# Patient Record
Sex: MALE | Race: WHITE | NOT HISPANIC OR LATINO | Employment: FULL TIME | URBAN - METROPOLITAN AREA
[De-identification: names, ages, dates, MRNs, and addresses within clinical notes are randomized per-mention and may not be internally consistent; named-entity substitution may affect disease eponyms.]

---

## 2017-05-05 ENCOUNTER — ALLSCRIPTS OFFICE VISIT (OUTPATIENT)
Dept: OTHER | Facility: OTHER | Age: 32
End: 2017-05-05

## 2017-06-01 ENCOUNTER — ALLSCRIPTS OFFICE VISIT (OUTPATIENT)
Dept: OTHER | Facility: OTHER | Age: 32
End: 2017-06-01

## 2017-06-01 DIAGNOSIS — M54.9 DORSALGIA: ICD-10-CM

## 2017-06-01 LAB
BILIRUB UR QL STRIP: NORMAL
CLARITY UR: NORMAL
COLOR UR: YELLOW
GLUCOSE (HISTORICAL): NORMAL
HGB UR QL STRIP.AUTO: NORMAL
KETONES UR STRIP-MCNC: NORMAL MG/DL
LEUKOCYTE ESTERASE UR QL STRIP: 75
NITRITE UR QL STRIP: NORMAL
PH UR STRIP.AUTO: 8 [PH]
PROT UR STRIP-MCNC: NORMAL MG/DL
SP GR UR STRIP.AUTO: 1
UROBILINOGEN UR QL STRIP.AUTO: NORMAL

## 2018-01-13 VITALS
BODY MASS INDEX: 24.48 KG/M2 | OXYGEN SATURATION: 99 % | RESPIRATION RATE: 16 BRPM | TEMPERATURE: 97.4 F | HEART RATE: 70 BPM | SYSTOLIC BLOOD PRESSURE: 130 MMHG | WEIGHT: 171 LBS | DIASTOLIC BLOOD PRESSURE: 78 MMHG | HEIGHT: 70 IN

## 2018-01-13 VITALS
DIASTOLIC BLOOD PRESSURE: 70 MMHG | WEIGHT: 169 LBS | HEART RATE: 68 BPM | HEIGHT: 70 IN | SYSTOLIC BLOOD PRESSURE: 120 MMHG | RESPIRATION RATE: 18 BRPM | TEMPERATURE: 97.7 F | BODY MASS INDEX: 24.2 KG/M2

## 2018-07-30 ENCOUNTER — OFFICE VISIT (OUTPATIENT)
Dept: FAMILY MEDICINE CLINIC | Facility: CLINIC | Age: 33
End: 2018-07-30
Payer: COMMERCIAL

## 2018-07-30 VITALS
WEIGHT: 162 LBS | RESPIRATION RATE: 16 BRPM | BODY MASS INDEX: 23.19 KG/M2 | DIASTOLIC BLOOD PRESSURE: 70 MMHG | TEMPERATURE: 97.6 F | HEIGHT: 70 IN | SYSTOLIC BLOOD PRESSURE: 122 MMHG | HEART RATE: 80 BPM

## 2018-07-30 DIAGNOSIS — J30.2 SEASONAL ALLERGIC RHINITIS, UNSPECIFIED TRIGGER: Primary | ICD-10-CM

## 2018-07-30 DIAGNOSIS — R06.00 DYSPNEA, UNSPECIFIED TYPE: ICD-10-CM

## 2018-07-30 PROCEDURE — 1036F TOBACCO NON-USER: CPT | Performed by: NURSE PRACTITIONER

## 2018-07-30 PROCEDURE — 3008F BODY MASS INDEX DOCD: CPT | Performed by: NURSE PRACTITIONER

## 2018-07-30 PROCEDURE — 99214 OFFICE O/P EST MOD 30 MIN: CPT | Performed by: NURSE PRACTITIONER

## 2018-07-30 RX ORDER — ALBUTEROL SULFATE 90 UG/1
2 AEROSOL, METERED RESPIRATORY (INHALATION) EVERY 6 HOURS PRN
Qty: 1 INHALER | Refills: 3 | Status: SHIPPED | OUTPATIENT
Start: 2018-07-30 | End: 2020-01-27 | Stop reason: SDUPTHER

## 2018-07-30 RX ORDER — MONTELUKAST SODIUM 10 MG/1
10 TABLET ORAL
Qty: 30 TABLET | Refills: 5 | Status: SHIPPED | OUTPATIENT
Start: 2018-07-30 | End: 2020-01-27 | Stop reason: SDUPTHER

## 2018-07-30 NOTE — PATIENT INSTRUCTIONS
Singulair 10mg daily  Rescue inhaler , 2 puffs every 4-6 hours as needed for shortness breath, chest tightness, bronchospasm, coughing fits

## 2018-07-30 NOTE — PROGRESS NOTES
Assessment/Plan:  1  Seasonal allergic rhinitis, unspecified trigger  - montelukast (SINGULAIR) 10 mg tablet; Take 1 tablet (10 mg total) by mouth daily at bedtime  Dispense: 30 tablet; Refill: 5  - albuterol (PROVENTIL HFA,VENTOLIN HFA) 90 mcg/act inhaler; Inhale 2 puffs every 6 (six) hours as needed for wheezing or shortness of breath  Dispense: 1 Inhaler; Refill: 3    2  Dyspnea, unspecified type    - montelukast (SINGULAIR) 10 mg tablet; Take 1 tablet (10 mg total) by mouth daily at bedtime  Dispense: 30 tablet; Refill: 5  - albuterol (PROVENTIL HFA,VENTOLIN HFA) 90 mcg/act inhaler; Inhale 2 puffs every 6 (six) hours as needed for wheezing or shortness of breath  Dispense: 1 Inhaler; Refill: 3            Subjective:      Patient ID: Bonifacio Yoder is a 34 y o  male who presents for allergies    Here for allergies  This time of year usually struggles with chest tightness, and difficult to get a full breath  Has been on singulair and rescue inhaler in past    No nasal congestion  No cough  No fever  Symptoms tend to start summer and end when winter starts  The following portions of the patient's history were reviewed and updated as appropriate: allergies, current medications, past family history, past medical history, past social history, past surgical history and problem list     Review of Systems   HENT: Negative for congestion, ear pain, postnasal drip, rhinorrhea, sinus pain, sinus pressure, sneezing and sore throat  Eyes: Negative for discharge and itching  Respiratory: Positive for chest tightness and shortness of breath  Negative for wheezing  Neurological: Negative for dizziness and headaches           Objective:      /70 (BP Location: Left arm, Patient Position: Sitting, Cuff Size: Standard)   Pulse 80   Temp 97 6 °F (36 4 °C)   Resp 16   Ht 5' 10" (1 778 m)   Wt 73 5 kg (162 lb)   BMI 23 24 kg/m²          Physical Exam   Constitutional: He is oriented to person, place, and time  He appears well-developed and well-nourished  No distress  Cardiovascular: Normal rate, regular rhythm and normal heart sounds  No murmur heard  Pulmonary/Chest: Effort normal and breath sounds normal  No respiratory distress  He has no wheezes  Lymphadenopathy:     He has no cervical adenopathy  Neurological: He is alert and oriented to person, place, and time  Skin: Skin is warm and dry  No rash noted  No erythema  Psychiatric: His behavior is normal  Judgment and thought content normal    Vitals reviewed

## 2020-01-27 ENCOUNTER — OFFICE VISIT (OUTPATIENT)
Dept: FAMILY MEDICINE CLINIC | Facility: CLINIC | Age: 35
End: 2020-01-27
Payer: COMMERCIAL

## 2020-01-27 VITALS
HEIGHT: 70 IN | SYSTOLIC BLOOD PRESSURE: 100 MMHG | DIASTOLIC BLOOD PRESSURE: 85 MMHG | BODY MASS INDEX: 25.62 KG/M2 | WEIGHT: 179 LBS | HEART RATE: 90 BPM | RESPIRATION RATE: 18 BRPM | TEMPERATURE: 97.6 F

## 2020-01-27 DIAGNOSIS — R06.00 DYSPNEA, UNSPECIFIED TYPE: ICD-10-CM

## 2020-01-27 DIAGNOSIS — J30.2 SEASONAL ALLERGIC RHINITIS, UNSPECIFIED TRIGGER: Primary | ICD-10-CM

## 2020-01-27 DIAGNOSIS — N52.9 ERECTILE DYSFUNCTION, UNSPECIFIED ERECTILE DYSFUNCTION TYPE: ICD-10-CM

## 2020-01-27 PROCEDURE — 99214 OFFICE O/P EST MOD 30 MIN: CPT | Performed by: FAMILY MEDICINE

## 2020-01-27 PROCEDURE — 3008F BODY MASS INDEX DOCD: CPT | Performed by: FAMILY MEDICINE

## 2020-01-27 PROCEDURE — 1036F TOBACCO NON-USER: CPT | Performed by: FAMILY MEDICINE

## 2020-01-27 RX ORDER — MONTELUKAST SODIUM 10 MG/1
10 TABLET ORAL
Qty: 30 TABLET | Refills: 5 | Status: SHIPPED | OUTPATIENT
Start: 2020-01-27 | End: 2020-07-07

## 2020-01-27 RX ORDER — ALBUTEROL SULFATE 90 UG/1
2 AEROSOL, METERED RESPIRATORY (INHALATION) EVERY 6 HOURS PRN
Qty: 1 INHALER | Refills: 3 | Status: SHIPPED | OUTPATIENT
Start: 2020-01-27 | End: 2020-07-07

## 2020-01-27 NOTE — PROGRESS NOTES
Chief Complaint   Patient presents with    Shortness of Breath     at rest     Erectile Dysfunction     sporadic        Patient ID: Zane Treviño is a 30 y o  male  HPI  Pt is seeing for seasonal allergies "acting out"  -  Having similar problems every year at this season -  Singulair and rescue inhaler helped in the past -  Also noticed intermittent ED issues over last few months- taking "work out supplements" prior to [employer] time  -  Denies drug use -  Was drinking more  -  Stopped 3 m ago -  Not better     The following portions of the patient's history were reviewed and updated as appropriate: allergies, current medications, past family history, past medical history, past social history, past surgical history and problem list     Review of Systems   Constitutional: Negative  Respiratory: Positive for shortness of breath  Negative for cough, chest tightness and wheezing  Cardiovascular: Negative  Gastrointestinal: Negative  Genitourinary: Negative for decreased urine volume, difficulty urinating, discharge, dysuria, genital sores, penile pain, penile swelling, scrotal swelling and testicular pain  ED    Musculoskeletal: Negative  Skin: Negative  Neurological: Negative  Psychiatric/Behavioral: The patient is nervous/anxious (increased stress at work )  Objective:    /85   Pulse 90   Temp 97 6 °F (36 4 °C) (Tympanic)   Resp 18   Ht 5' 10" (1 778 m)   Wt 81 2 kg (179 lb)   BMI 25 68 kg/m²        Physical Exam   Constitutional: He appears well-nourished  He does not appear ill  HENT:   Mouth/Throat: No oropharyngeal exudate  Cardiovascular: Normal rate and regular rhythm  No murmur heard  Pulmonary/Chest: Effort normal and breath sounds normal  No respiratory distress  He has no wheezes  He has no rales  Abdominal: Soft  Skin: No rash noted  No pallor  Psychiatric: He has a normal mood and affect   His behavior is normal  Judgment and thought content normal          Labs in chart were reviewed  Assessment/Plan:         Diagnoses and all orders for this visit:    Seasonal allergic rhinitis, unspecified trigger  -     albuterol (PROVENTIL HFA,VENTOLIN HFA) 90 mcg/act inhaler; Inhale 2 puffs every 6 (six) hours as needed for wheezing or shortness of breath  -     montelukast (SINGULAIR) 10 mg tablet; Take 1 tablet (10 mg total) by mouth daily at bedtime    Dyspnea, unspecified type  -     albuterol (PROVENTIL HFA,VENTOLIN HFA) 90 mcg/act inhaler; Inhale 2 puffs every 6 (six) hours as needed for wheezing or shortness of breath  -     montelukast (SINGULAIR) 10 mg tablet; Take 1 tablet (10 mg total) by mouth daily at bedtime    Erectile dysfunction, unspecified erectile dysfunction type  -     Ambulatory referral to Urology; Future  Likely psychological  Was advised to see therapist           BMI Counseling: Body mass index is 25 68 kg/m²  Discussed the patient's BMI with him  The BMI is above normal  Nutrition recommendations include reducing portion sizes, decreasing overall calorie intake, 3-5 servings of fruits/vegetables daily, reducing fast food intake and consuming healthier snacks  Exercise recommendations include exercising 3-5 times per week           rto prn         Angie Ernst MD

## 2020-03-03 ENCOUNTER — CONSULT (OUTPATIENT)
Dept: UROLOGY | Facility: CLINIC | Age: 35
End: 2020-03-03
Payer: COMMERCIAL

## 2020-03-03 VITALS
BODY MASS INDEX: 25.22 KG/M2 | HEIGHT: 70 IN | WEIGHT: 176.2 LBS | DIASTOLIC BLOOD PRESSURE: 68 MMHG | SYSTOLIC BLOOD PRESSURE: 116 MMHG | HEART RATE: 120 BPM

## 2020-03-03 DIAGNOSIS — N52.9 ERECTILE DYSFUNCTION, UNSPECIFIED ERECTILE DYSFUNCTION TYPE: ICD-10-CM

## 2020-03-03 PROCEDURE — 1036F TOBACCO NON-USER: CPT | Performed by: PHYSICIAN ASSISTANT

## 2020-03-03 PROCEDURE — 99203 OFFICE O/P NEW LOW 30 MIN: CPT | Performed by: PHYSICIAN ASSISTANT

## 2020-03-03 PROCEDURE — 3008F BODY MASS INDEX DOCD: CPT | Performed by: PHYSICIAN ASSISTANT

## 2020-03-03 RX ORDER — SILDENAFIL CITRATE 20 MG/1
TABLET ORAL
Qty: 10 TABLET | Refills: 3 | Status: SHIPPED | OUTPATIENT
Start: 2020-03-03 | End: 2020-04-14 | Stop reason: SDUPTHER

## 2020-03-03 NOTE — PROGRESS NOTES
1  Erectile dysfunction, unspecified erectile dysfunction type  Ambulatory referral to Urology    sildenafil (REVATIO) 20 mg tablet       Assessment and plan:       1  Erectile dysfunction  - reviewed with patient that this appears primarily psychological in origin  - reviewed minimizing stress, cardiovascular excise, healthy diet, and good sleep  - will trial a short course of Sildenafil 20mg as needed  Use and side effect profile reviewed  - should this not improved, patient was counseled to further seek therapy  - urine encouraged contact us in the future with any concerns, otherwise will follow-up as needed  Anila Edgar PA-C      Chief Complaint     Chief Complaint   Patient presents with    Erectile Dysfunction       History of Present Illness     Shelli Tiwari is a 30 y o  Male presenting today as a new patient for erectile dysfunction  Patient states he had an issue approximately 3 months ago in which he was unable to achieve an erection  Patient states that he remain dated on this for period of time and then was having difficulties achieving fully rigid erections  He has been able to masturbate without any complication however is having issues with sexual relations  Denies any painful erections, pain during ejaculation, or urinary symptoms  Patient does endorse some stressors at his work however the constant for him  He is able to continue with daily exercise and reports adequate sleep  Denies any palpitations or SOB  He has made some dietary adjustments and has not notice any improvement  He denies the use steroids or TRT  Patient denies any history of hypertension, diabetes, lumbosacral disc disease, or underlying neurologic conditions  Review of Systems     Review of Systems   Constitutional: Negative for activity change, appetite change, chills, diaphoresis, fatigue, fever and unexpected weight change     Respiratory: Negative for chest tightness and shortness of breath  Cardiovascular: Negative for chest pain, palpitations and leg swelling  Gastrointestinal: Negative for abdominal distention, abdominal pain, constipation, diarrhea, nausea and vomiting  Genitourinary: Negative for decreased urine volume, difficulty urinating, dysuria, enuresis, flank pain, frequency, genital sores, hematuria and urgency  Erectile dysfunction   Musculoskeletal: Negative for back pain, gait problem and myalgias  Skin: Negative for color change, pallor, rash and wound  Psychiatric/Behavioral: Negative for behavioral problems  The patient is not nervous/anxious  Allergies     No Known Allergies    Physical Exam     Physical Exam   Constitutional: He is oriented to person, place, and time  He appears well-developed and well-nourished  No distress  HENT:   Head: Normocephalic and atraumatic  Right Ear: External ear normal    Left Ear: External ear normal    Nose: Nose normal    Eyes: Conjunctivae are normal  Right eye exhibits no discharge  Left eye exhibits no discharge  Neck: Normal range of motion  No tracheal deviation present  Pulmonary/Chest: Effort normal  No respiratory distress  Musculoskeletal: Normal range of motion  He exhibits no edema or deformity  Neurological: He is alert and oriented to person, place, and time  Skin: Skin is warm and dry  No rash noted  He is not diaphoretic  No erythema     Psychiatric:   Anxious throughout examination         Vital Signs     Vitals:    03/03/20 1450   BP: 116/68   BP Location: Left arm   Patient Position: Sitting   Cuff Size: Standard   Pulse: (!) 120   Weight: 79 9 kg (176 lb 3 2 oz)   Height: 5' 10" (1 778 m)         Current Medications       Current Outpatient Medications:     albuterol (PROVENTIL HFA,VENTOLIN HFA) 90 mcg/act inhaler, Inhale 2 puffs every 6 (six) hours as needed for wheezing or shortness of breath, Disp: 1 Inhaler, Rfl: 3    montelukast (SINGULAIR) 10 mg tablet, Take 1 tablet (10 mg total) by mouth daily at bedtime, Disp: 30 tablet, Rfl: 5    sildenafil (REVATIO) 20 mg tablet, Take 1 tablet PO PRN sexual function, Disp: 10 tablet, Rfl: 3      Active Problems     Patient Active Problem List   Diagnosis    Allergic cough         Past Medical History     History reviewed  No pertinent past medical history  Surgical History     History reviewed  No pertinent surgical history        Family History     Family History   Problem Relation Age of Onset    Hypertension Father          Social History     Social History       Radiology

## 2020-04-14 DIAGNOSIS — N52.9 ERECTILE DYSFUNCTION, UNSPECIFIED ERECTILE DYSFUNCTION TYPE: ICD-10-CM

## 2020-04-15 RX ORDER — SILDENAFIL CITRATE 20 MG/1
TABLET ORAL
Qty: 10 TABLET | Refills: 0 | Status: SHIPPED | OUTPATIENT
Start: 2020-04-15 | End: 2020-05-01 | Stop reason: SDUPTHER

## 2020-05-01 DIAGNOSIS — N52.9 ERECTILE DYSFUNCTION, UNSPECIFIED ERECTILE DYSFUNCTION TYPE: ICD-10-CM

## 2020-05-01 RX ORDER — SILDENAFIL CITRATE 20 MG/1
TABLET ORAL
Qty: 10 TABLET | Refills: 3 | Status: SHIPPED | OUTPATIENT
Start: 2020-05-01 | End: 2020-05-15 | Stop reason: SDUPTHER

## 2020-05-15 ENCOUNTER — OFFICE VISIT (OUTPATIENT)
Dept: FAMILY MEDICINE CLINIC | Facility: CLINIC | Age: 35
End: 2020-05-15
Payer: COMMERCIAL

## 2020-05-15 VITALS — SYSTOLIC BLOOD PRESSURE: 92 MMHG | DIASTOLIC BLOOD PRESSURE: 60 MMHG | TEMPERATURE: 98.8 F | HEART RATE: 72 BPM

## 2020-05-15 DIAGNOSIS — N52.9 ERECTILE DYSFUNCTION, UNSPECIFIED ERECTILE DYSFUNCTION TYPE: ICD-10-CM

## 2020-05-15 DIAGNOSIS — M54.50 ACUTE LEFT-SIDED LOW BACK PAIN WITHOUT SCIATICA: Primary | ICD-10-CM

## 2020-05-15 PROCEDURE — 1036F TOBACCO NON-USER: CPT | Performed by: FAMILY MEDICINE

## 2020-05-15 PROCEDURE — 99214 OFFICE O/P EST MOD 30 MIN: CPT | Performed by: FAMILY MEDICINE

## 2020-05-15 RX ORDER — SILDENAFIL CITRATE 20 MG/1
TABLET ORAL
Qty: 10 TABLET | Refills: 0 | Status: SHIPPED | OUTPATIENT
Start: 2020-05-15 | End: 2020-06-09 | Stop reason: SDUPTHER

## 2020-05-15 RX ORDER — NAPROXEN 500 MG/1
500 TABLET ORAL 2 TIMES DAILY WITH MEALS
Qty: 14 TABLET | Refills: 0 | Status: SHIPPED | OUTPATIENT
Start: 2020-05-15 | End: 2020-07-07

## 2020-05-15 RX ORDER — CYCLOBENZAPRINE HCL 5 MG
5 TABLET ORAL 3 TIMES DAILY PRN
Qty: 30 TABLET | Refills: 0 | Status: SHIPPED | OUTPATIENT
Start: 2020-05-15 | End: 2020-07-07

## 2020-06-09 DIAGNOSIS — N52.9 ERECTILE DYSFUNCTION, UNSPECIFIED ERECTILE DYSFUNCTION TYPE: ICD-10-CM

## 2020-06-10 RX ORDER — SILDENAFIL CITRATE 20 MG/1
TABLET ORAL
Qty: 30 TABLET | Refills: 6 | Status: SHIPPED | OUTPATIENT
Start: 2020-06-10 | End: 2020-07-07

## 2020-07-07 ENCOUNTER — OFFICE VISIT (OUTPATIENT)
Dept: FAMILY MEDICINE CLINIC | Facility: CLINIC | Age: 35
End: 2020-07-07
Payer: COMMERCIAL

## 2020-07-07 VITALS
HEIGHT: 70 IN | TEMPERATURE: 98.6 F | RESPIRATION RATE: 16 BRPM | BODY MASS INDEX: 24.48 KG/M2 | SYSTOLIC BLOOD PRESSURE: 134 MMHG | WEIGHT: 171 LBS | HEART RATE: 86 BPM | DIASTOLIC BLOOD PRESSURE: 80 MMHG

## 2020-07-07 DIAGNOSIS — B36.0 TINEA VERSICOLOR: Primary | ICD-10-CM

## 2020-07-07 PROCEDURE — 99214 OFFICE O/P EST MOD 30 MIN: CPT | Performed by: FAMILY MEDICINE

## 2020-07-07 PROCEDURE — 1036F TOBACCO NON-USER: CPT | Performed by: FAMILY MEDICINE

## 2020-07-07 PROCEDURE — 3008F BODY MASS INDEX DOCD: CPT | Performed by: FAMILY MEDICINE

## 2020-07-07 RX ORDER — KETOCONAZOLE 20 MG/ML
1 SHAMPOO TOPICAL DAILY
Qty: 360 ML | Refills: 1 | Status: SHIPPED | OUTPATIENT
Start: 2020-07-07 | End: 2021-11-06

## 2020-07-07 NOTE — PROGRESS NOTES
Chief Complaint   Patient presents with    Rash        Patient ID: Chelo Zhang is a 37 y o  male  Subjective    Chelo Zhang is a 37 y o  male who presents for evaluation of a rash involving the chest and trunk  Rash started several months ago  Lesions are skin-colored and adarsh-colored, and flat in texture  Rash has changed over time  Rash causes no discomfort  Associated symptoms: none  Patient denies: abdominal pain, arthralgia, congestion, cough, crankiness, decrease in appetite, decrease in energy level, fever, headache, irritability, myalgia, nausea, sore throat and vomiting  Patient has not had contacts with similar rash  Patient has not had new exposures (soaps, lotions, laundry detergents, foods, medications, plants, insects or animals)  The following portions of the patient's history were reviewed and updated as appropriate: allergies, current medications, past family history, past medical history, past social history, past surgical history and problem list     Review of Systems  Pertinent items are noted in HPI       Objective    /80 (BP Location: Left arm, Patient Position: Sitting, Cuff Size: Standard)   Pulse 86   Temp 98 6 °F (37 °C) (Tympanic)   Resp 16   Ht 5' 10" (1 778 m)   Wt 77 6 kg (171 lb)   BMI 24 54 kg/m²   General:  alert and oriented, in no acute distress  Skin:  hyperpigmentation noted on extremities and trunk    Assessment/Plan    tinea versicolor  Medications: ketoconazole              The following portions of the patient's history were reviewed and updated as appropriate: allergies, current medications, past family history, past medical history, past social history, past surgical history and problem list     Review of Systems    No current outpatient medications on file  No current facility-administered medications for this visit          Objective:    /80 (BP Location: Left arm, Patient Position: Sitting, Cuff Size: Standard)   Pulse 86   Temp 98 6 °F (37 °C) (Tympanic)   Resp 16   Ht 5' 10" (1 778 m)   Wt 77 6 kg (171 lb)   BMI 24 54 kg/m²        Physical Exam            Assessment/Plan:         Diagnoses and all orders for this visit:    Tinea versicolor  -     ketoconazole (NIZORAL) 2 % shampoo;  Apply 1 application topically daily for 3 days                          Alejandra Pérez MD    Answers for HPI/ROS submitted by the patient on 7/1/2020   Rash  Chronicity: recurrent  Onset: more than 1 month ago  Progression since onset: gradually worsening  Affected locations: face, chest, back, right arm  Characteristics: redness  Exposed to: nothing  anorexia: No  congestion: No  cough: No  diarrhea: No  eye pain: No  facial edema: No  fatigue: No  fever: No  joint pain: No  nail changes: No  rhinorrhea: No  shortness of breath: No  sore throat: No  vomiting: No

## 2020-08-17 ENCOUNTER — TELEPHONE (OUTPATIENT)
Dept: UROLOGY | Facility: AMBULATORY SURGERY CENTER | Age: 35
End: 2020-08-17

## 2020-08-17 NOTE — TELEPHONE ENCOUNTER
Patient is calling for a refill on Sidenafil  Stated he has [employer] and he needs prior authorization

## 2020-08-18 NOTE — TELEPHONE ENCOUNTER
Sildenafil is denied with the patients insurance this drug is not covered for his diagnosis     Thanks  [employer]

## 2020-08-18 NOTE — TELEPHONE ENCOUNTER
Called and spoke to patient at this time advised medication denied pre auth my insurance  Patient states he decided to pay out of pocket for the medication because it would of only been a couple dollars cheaper with insurance covering it      Patient states he does have the medication and is thankful for the call

## 2021-04-15 DIAGNOSIS — Z23 ENCOUNTER FOR IMMUNIZATION: ICD-10-CM

## 2021-04-19 ENCOUNTER — IMMUNIZATIONS (OUTPATIENT)
Dept: FAMILY MEDICINE CLINIC | Facility: HOSPITAL | Age: 36
End: 2021-04-19

## 2021-04-19 DIAGNOSIS — Z23 ENCOUNTER FOR IMMUNIZATION: Primary | ICD-10-CM

## 2021-04-19 PROCEDURE — 91300 SARS-COV-2 / COVID-19 MRNA VACCINE (PFIZER-BIONTECH) 30 MCG: CPT

## 2021-04-19 PROCEDURE — 0001A SARS-COV-2 / COVID-19 MRNA VACCINE (PFIZER-BIONTECH) 30 MCG: CPT

## 2021-05-14 ENCOUNTER — IMMUNIZATIONS (OUTPATIENT)
Dept: FAMILY MEDICINE CLINIC | Facility: HOSPITAL | Age: 36
End: 2021-05-14

## 2021-05-14 DIAGNOSIS — Z23 ENCOUNTER FOR IMMUNIZATION: Primary | ICD-10-CM

## 2021-05-14 PROCEDURE — 0002A SARS-COV-2 / COVID-19 MRNA VACCINE (PFIZER-BIONTECH) 30 MCG: CPT

## 2021-05-14 PROCEDURE — 91300 SARS-COV-2 / COVID-19 MRNA VACCINE (PFIZER-BIONTECH) 30 MCG: CPT

## 2021-05-29 ENCOUNTER — OFFICE VISIT (OUTPATIENT)
Dept: URGENT CARE | Facility: CLINIC | Age: 36
End: 2021-05-29
Payer: COMMERCIAL

## 2021-05-29 VITALS
HEART RATE: 77 BPM | DIASTOLIC BLOOD PRESSURE: 80 MMHG | OXYGEN SATURATION: 98 % | HEIGHT: 71 IN | WEIGHT: 170 LBS | BODY MASS INDEX: 23.8 KG/M2 | SYSTOLIC BLOOD PRESSURE: 117 MMHG | TEMPERATURE: 97.7 F | RESPIRATION RATE: 16 BRPM

## 2021-05-29 DIAGNOSIS — J06.9 VIRAL UPPER RESPIRATORY TRACT INFECTION: Primary | ICD-10-CM

## 2021-05-29 PROCEDURE — 99213 OFFICE O/P EST LOW 20 MIN: CPT | Performed by: PHYSICIAN ASSISTANT

## 2021-05-29 PROCEDURE — 1036F TOBACCO NON-USER: CPT | Performed by: PHYSICIAN ASSISTANT

## 2021-05-29 PROCEDURE — 3008F BODY MASS INDEX DOCD: CPT | Performed by: PHYSICIAN ASSISTANT

## 2021-05-29 NOTE — PROGRESS NOTES
[facility]        NAME: Galina Chavez is a 37 y o  male  : 1985    MRN: [id number]  DATE: May 29, 2021  TIME: 12:56 PM    Assessment and Plan   Viral upper respiratory tract infection [J06 9]  1  Viral upper respiratory tract infection       Patient Instructions   Viral upper respiratory infection  Rapid strep negative  Recommend mucinex DM for cough  Rest, fluids and supportive care  May benefit from a cool mist humidifier on night stand  Tylenol/ibuprofen as needed for pain/fever    Follow up with PCP in 3-5 days  Proceed to  ER if symptoms worsen  Chief Complaint     Chief Complaint   Patient presents with    URI     Pt reports of worsening URI s/s started approx 4-5 days ago with cough and congestion  History of Present Illness       Ever Alberta is a 81-year-old male who presents to clinic complaining of cough, nasal congestion, and sore throat x6 days  He describes the cough is dry and nonproductive and no worse when he lays down  States that his symptoms and remain unchanged over the last 6 days  He denies any fever, chills, ear pain, fatigue, myalgias, nausea, vomiting, shortness of breath, loss of taste or smell, recent travel, or exposure to anyone known COVID positive  He received his 2nd COVID vaccine 2 weeks ago  Review of Systems   Review of Systems   Constitutional: Negative for chills and fever  HENT: Positive for congestion and sore throat  Negative for ear pain, rhinorrhea, sinus pressure and sinus pain  Respiratory: Positive for cough  Negative for shortness of breath  Gastrointestinal: Negative for diarrhea, nausea and vomiting  Musculoskeletal: Negative for myalgias  Neurological: Negative for headaches           Current Medications       Current Outpatient Medications:     ketoconazole (NIZORAL) 2 % shampoo, Apply 1 application topically daily for 3 days, Disp: 360 mL, Rfl: 1    Current Allergies     Allergies as of 2021    (No Known Allergies)            The following portions of the patient's history were reviewed and updated as appropriate: allergies, current medications, past family history, past medical history, past social history, past surgical history and problem list      History reviewed  No pertinent past medical history  History reviewed  No pertinent surgical history  Family History   Problem Relation Age of Onset    Hypertension Father          Medications have been verified  Objective   /80   Pulse 77   Temp 97 7 °F (36 5 °C)   Resp 16   Ht 5' 11" (1 803 m)   Wt 77 1 kg (170 lb)   SpO2 98%   BMI 23 71 kg/m²   No LMP for male patient  Physical Exam     Physical Exam  Vitals signs and nursing note reviewed  Constitutional:       General: He is not in acute distress  Appearance: Normal appearance  He is not ill-appearing  HENT:      Right Ear: Tympanic membrane, ear canal and external ear normal       Left Ear: Tympanic membrane, ear canal and external ear normal       Nose: Congestion present  Mouth/Throat:      Mouth: Mucous membranes are moist       Pharynx: Posterior oropharyngeal erythema present  Cardiovascular:      Rate and Rhythm: Normal rate and regular rhythm  Heart sounds: Normal heart sounds  Pulmonary:      Effort: Pulmonary effort is normal       Breath sounds: Normal breath sounds  Lymphadenopathy:      Cervical: No cervical adenopathy  Neurological:      Mental Status: He is alert and oriented to person, place, and time     Psychiatric:         Mood and Affect: Mood normal          Behavior: Behavior normal

## 2021-05-29 NOTE — PATIENT INSTRUCTIONS
Viral upper respiratory infection  Rapid strep negative  Recommend mucinex DM for cough  Rest, fluids and supportive care  May benefit from a cool mist humidifier on night stand  Tylenol/ibuprofen as needed for pain/fever    Follow up with PCP in 3-5 days  Proceed to  ER if symptoms worsen

## 2021-05-29 NOTE — LETTER
May 29, 2021     Patient: Chelo Zhang   YOB: 1985   Date of Visit: 5/29/2021       To Whom it May Concern:    Royal Jordan was seen in my clinic on 5/29/2021  Please excuse from work on 5/29/21  If you have any questions or concerns, please don't hesitate to call           Sincerely,          Greg Radford PA-C        CC: No Recipients

## 2021-06-30 NOTE — TELEPHONE ENCOUNTER
Patient left a message on the Medication Refill voice mail line requesting a new prescription for Sildenafil 20mg, 30 count supply to [facility] in [address]  Patient using [employer] to purchase this medication

## 2021-07-02 NOTE — TELEPHONE ENCOUNTER
I attempted to reach the patient by phone but needed to leave a message on his mobile phone voice mail  I left my direct dial number for ease in communication  I await a return phone call before taking further action

## 2021-09-28 ENCOUNTER — OFFICE VISIT (OUTPATIENT)
Dept: URGENT CARE | Facility: CLINIC | Age: 36
End: 2021-09-28
Payer: COMMERCIAL

## 2021-09-28 VITALS
SYSTOLIC BLOOD PRESSURE: 131 MMHG | OXYGEN SATURATION: 98 % | RESPIRATION RATE: 14 BRPM | HEART RATE: 70 BPM | TEMPERATURE: 97 F | DIASTOLIC BLOOD PRESSURE: 82 MMHG

## 2021-09-28 DIAGNOSIS — R53.83 FATIGUE, UNSPECIFIED TYPE: Primary | ICD-10-CM

## 2021-09-28 PROCEDURE — 99213 OFFICE O/P EST LOW 20 MIN: CPT | Performed by: FAMILY MEDICINE

## 2021-09-28 NOTE — PROGRESS NOTES
[facility]        NAME: Alice France is a 31 y o  male  : 1985    MRN: [id number]  DATE: 2021  TIME: 4:00 PM    Assessment and Plan   Fatigue, unspecified type [R53 83]  1  Fatigue, unspecified type       Discussed lifestyle modifications  Fatigue on likely due to metabolic derangement  Would benefit from increased caloric intake and increased sleep  Encouraged he continue exercising 6 days a week  Patient Instructions     Follow up with PCP in 3-5 days  Proceed to  ER if symptoms worsen  Chief Complaint     Chief Complaint   Patient presents with    Fatigue     x several weeks; denies other symptoms          History of Present Illness     17-year-old male presents today with complaints of fatigue especially when waking up the morning  Sleeps about 7-8 hours nightly  Exercises for 1 hour x6 days weekly doing calisthenics and weightlifting  This morning he had a protein shake; had a granola bar for lunch and plans to eat a small plate of rice for dinner  Review of Systems   Review of Systems   Constitutional: Positive for fatigue  Negative for chills and fever  HENT: Negative for congestion, rhinorrhea and sore throat  Respiratory: Negative for cough, shortness of breath and wheezing  Cardiovascular: Negative for chest pain  Gastrointestinal: Negative for abdominal pain and nausea  Musculoskeletal: Negative for arthralgias  Skin: Negative for rash  Neurological: Negative for dizziness and headaches           Current Medications       Current Outpatient Medications:     ketoconazole (NIZORAL) 2 % shampoo, Apply 1 application topically daily for 3 days, Disp: 360 mL, Rfl: 1    sildenafil (REVATIO) 20 mg tablet, Take 1-5 tablets PO PRN erectile function, Disp: 30 tablet, Rfl: 3    Current Allergies     Allergies as of 2021    (No Known Allergies)            The following portions of the patient's history were reviewed and updated as appropriate: allergies, current medications, past family history, past medical history, past social history, past surgical history and problem list      History reviewed  No pertinent past medical history  History reviewed  No pertinent surgical history  Family History   Problem Relation Age of Onset    Hypertension Father          Medications have been verified  Objective   /82   Pulse 70   Temp (!) 97 °F (36 1 °C)   Resp 14   SpO2 98%   No LMP for male patient  Physical Exam     Physical Exam  Vitals and nursing note reviewed  Constitutional:       General: He is not in acute distress  Appearance: Normal appearance  He is normal weight  He is not ill-appearing, toxic-appearing or diaphoretic  HENT:      Head: Normocephalic and atraumatic  Eyes:      General:         Right eye: No discharge  Left eye: No discharge  Conjunctiva/sclera: Conjunctivae normal    Cardiovascular:      Rate and Rhythm: Normal rate and regular rhythm  Pulmonary:      Effort: Pulmonary effort is normal  No respiratory distress  Breath sounds: No wheezing, rhonchi or rales  Musculoskeletal:         General: No tenderness  Skin:     General: Skin is warm  Findings: No erythema  Neurological:      General: No focal deficit present  Mental Status: He is alert and oriented to person, place, and time  Psychiatric:         Mood and Affect: Mood normal          Behavior: Behavior normal          Thought Content:  Thought content normal          Judgment: Judgment normal

## 2021-11-06 ENCOUNTER — HOSPITAL ENCOUNTER (EMERGENCY)
Facility: HOSPITAL | Age: 36
Discharge: HOME | End: 2021-11-06
Attending: EMERGENCY MEDICINE | Admitting: EMERGENCY MEDICINE
Payer: COMMERCIAL

## 2021-11-06 ENCOUNTER — APPOINTMENT (EMERGENCY)
Dept: RADIOLOGY | Facility: HOSPITAL | Age: 36
End: 2021-11-06
Payer: COMMERCIAL

## 2021-11-06 VITALS
SYSTOLIC BLOOD PRESSURE: 121 MMHG | RESPIRATION RATE: 18 BRPM | WEIGHT: 180 LBS | TEMPERATURE: 98 F | OXYGEN SATURATION: 96 % | DIASTOLIC BLOOD PRESSURE: 74 MMHG | HEART RATE: 105 BPM | BODY MASS INDEX: 25.1 KG/M2

## 2021-11-06 DIAGNOSIS — S92.353A FRACTURE OF 5TH METATARSAL: Primary | ICD-10-CM

## 2021-11-06 PROCEDURE — 99284 EMERGENCY DEPT VISIT MOD MDM: CPT | Performed by: PHYSICIAN ASSISTANT

## 2021-11-06 PROCEDURE — 99283 EMERGENCY DEPT VISIT LOW MDM: CPT

## 2021-11-06 PROCEDURE — 29515 APPLICATION SHORT LEG SPLINT: CPT | Performed by: PHYSICIAN ASSISTANT

## 2021-11-06 PROCEDURE — 73630 X-RAY EXAM OF FOOT: CPT

## 2021-11-06 RX ORDER — NAPROXEN 500 MG/1
500 TABLET ORAL 2 TIMES DAILY WITH MEALS
Qty: 14 TABLET | Refills: 0 | Status: SHIPPED | OUTPATIENT
Start: 2021-11-06 | End: 2021-11-13

## 2021-11-08 VITALS
SYSTOLIC BLOOD PRESSURE: 140 MMHG | DIASTOLIC BLOOD PRESSURE: 80 MMHG | WEIGHT: 180 LBS | HEIGHT: 71 IN | BODY MASS INDEX: 25.2 KG/M2 | HEART RATE: 80 BPM

## 2021-11-08 DIAGNOSIS — S92.355A CLOSED NONDISPLACED FRACTURE OF FIFTH METATARSAL BONE OF LEFT FOOT, INITIAL ENCOUNTER: Primary | ICD-10-CM

## 2021-11-08 PROCEDURE — 1036F TOBACCO NON-USER: CPT | Performed by: ORTHOPAEDIC SURGERY

## 2021-11-08 PROCEDURE — 99203 OFFICE O/P NEW LOW 30 MIN: CPT | Performed by: ORTHOPAEDIC SURGERY

## 2021-11-08 PROCEDURE — 28470 CLTX METATARSAL FX WO MNP EA: CPT | Performed by: ORTHOPAEDIC SURGERY

## 2021-11-23 ENCOUNTER — APPOINTMENT (OUTPATIENT)
Dept: RADIOLOGY | Facility: CLINIC | Age: 36
End: 2021-11-23
Payer: COMMERCIAL

## 2021-11-23 VITALS
BODY MASS INDEX: 25.2 KG/M2 | TEMPERATURE: 97.8 F | DIASTOLIC BLOOD PRESSURE: 72 MMHG | HEIGHT: 71 IN | WEIGHT: 180 LBS | SYSTOLIC BLOOD PRESSURE: 129 MMHG | HEART RATE: 82 BPM

## 2021-11-23 DIAGNOSIS — S92.355D CLOSED NONDISPLACED FRACTURE OF FIFTH METATARSAL BONE OF LEFT FOOT WITH ROUTINE HEALING, SUBSEQUENT ENCOUNTER: Primary | ICD-10-CM

## 2021-11-23 DIAGNOSIS — S92.355A CLOSED NONDISPLACED FRACTURE OF FIFTH METATARSAL BONE OF LEFT FOOT, INITIAL ENCOUNTER: ICD-10-CM

## 2021-11-23 PROCEDURE — 99024 POSTOP FOLLOW-UP VISIT: CPT | Performed by: ORTHOPAEDIC SURGERY

## 2021-11-23 PROCEDURE — 73630 X-RAY EXAM OF FOOT: CPT

## 2021-11-23 PROCEDURE — 3008F BODY MASS INDEX DOCD: CPT | Performed by: ORTHOPAEDIC SURGERY

## 2021-11-23 RX ORDER — METHOCARBAMOL 500 MG/1
TABLET, FILM COATED ORAL
COMMUNITY
Start: 2021-09-07

## 2021-12-17 ENCOUNTER — APPOINTMENT (OUTPATIENT)
Dept: RADIOLOGY | Facility: CLINIC | Age: 36
End: 2021-12-17
Payer: COMMERCIAL

## 2021-12-17 VITALS
DIASTOLIC BLOOD PRESSURE: 79 MMHG | SYSTOLIC BLOOD PRESSURE: 194 MMHG | HEART RATE: 64 BPM | BODY MASS INDEX: 25.2 KG/M2 | WEIGHT: 180 LBS | HEIGHT: 71 IN | TEMPERATURE: 97.3 F

## 2021-12-17 DIAGNOSIS — S92.355D CLOSED NONDISPLACED FRACTURE OF FIFTH METATARSAL BONE OF LEFT FOOT WITH ROUTINE HEALING, SUBSEQUENT ENCOUNTER: Primary | ICD-10-CM

## 2021-12-17 DIAGNOSIS — S92.355D CLOSED NONDISPLACED FRACTURE OF FIFTH METATARSAL BONE OF LEFT FOOT WITH ROUTINE HEALING, SUBSEQUENT ENCOUNTER: ICD-10-CM

## 2021-12-17 PROCEDURE — 1036F TOBACCO NON-USER: CPT | Performed by: PHYSICIAN ASSISTANT

## 2021-12-17 PROCEDURE — 99213 OFFICE O/P EST LOW 20 MIN: CPT | Performed by: PHYSICIAN ASSISTANT

## 2021-12-17 PROCEDURE — 73630 X-RAY EXAM OF FOOT: CPT

## 2021-12-17 PROCEDURE — 3008F BODY MASS INDEX DOCD: CPT | Performed by: PHYSICIAN ASSISTANT

## 2022-01-10 ENCOUNTER — TELEMEDICINE (OUTPATIENT)
Dept: FAMILY MEDICINE CLINIC | Facility: CLINIC | Age: 37
End: 2022-01-10
Payer: COMMERCIAL

## 2022-01-10 DIAGNOSIS — Z03.818 ENCOUNTER FOR OBSERVATION FOR SUSPECTED EXPOSURE TO OTHER BIOLOGICAL AGENTS RULED OUT: Primary | ICD-10-CM

## 2022-01-10 PROCEDURE — U0005 INFEC AGEN DETEC AMPLI PROBE: HCPCS | Performed by: NURSE PRACTITIONER

## 2022-01-10 PROCEDURE — 99212 OFFICE O/P EST SF 10 MIN: CPT | Performed by: NURSE PRACTITIONER

## 2022-01-10 PROCEDURE — U0003 INFECTIOUS AGENT DETECTION BY NUCLEIC ACID (DNA OR RNA); SEVERE ACUTE RESPIRATORY SYNDROME CORONAVIRUS 2 (SARS-COV-2) (CORONAVIRUS DISEASE [COVID-19]), AMPLIFIED PROBE TECHNIQUE, MAKING USE OF HIGH THROUGHPUT TECHNOLOGIES AS DESCRIBED BY CMS-2020-01-R: HCPCS | Performed by: NURSE PRACTITIONER

## 2022-01-10 NOTE — PROGRESS NOTES
Assessment/Plan:      Diagnoses and all orders for this visit:    Encounter for observation for suspected exposure to other biological agents ruled out  -     COVID Only - Office Collect          Subjective:     Patient ID: Ava Sam is a 31 y o  male  Patient here for covid testing  He is taking his father (my patient) for chemo this week and is required a negative test    He is feeling well      Review of Systems   Constitutional: Negative  Objective:     Physical Exam  Constitutional:       General: He is not in acute distress  Appearance: Normal appearance

## 2022-01-11 LAB — SARS-COV-2 RNA RESP QL NAA+PROBE: POSITIVE

## 2022-07-05 DIAGNOSIS — N52.9 ERECTILE DYSFUNCTION, UNSPECIFIED ERECTILE DYSFUNCTION TYPE: ICD-10-CM

## 2022-07-05 RX ORDER — SILDENAFIL CITRATE 20 MG/1
TABLET ORAL
Qty: 30 TABLET | Refills: 3 | Status: SHIPPED | OUTPATIENT
Start: 2022-07-05

## 2023-07-08 ENCOUNTER — AMB VIDEO VISIT (OUTPATIENT)
Dept: OTHER | Facility: HOSPITAL | Age: 38
End: 2023-07-08

## 2023-07-08 VITALS — RESPIRATION RATE: 16 BRPM

## 2023-07-08 DIAGNOSIS — J30.1 ALLERGIC RHINITIS DUE TO POLLEN, UNSPECIFIED SEASONALITY: Primary | ICD-10-CM

## 2023-07-08 PROCEDURE — ECARE PR SL URGENT CARE VIRTUAL VISIT: Performed by: NURSE PRACTITIONER

## 2023-07-08 RX ORDER — ALBUTEROL SULFATE 90 UG/1
2 AEROSOL, METERED RESPIRATORY (INHALATION) EVERY 6 HOURS PRN
Qty: 8.5 G | Refills: 0 | Status: SHIPPED | OUTPATIENT
Start: 2023-07-08

## 2023-07-08 RX ORDER — MONTELUKAST SODIUM 10 MG/1
10 TABLET ORAL
Qty: 14 TABLET | Refills: 0 | Status: SHIPPED | OUTPATIENT
Start: 2023-07-08 | End: 2023-07-22

## 2023-07-08 NOTE — PROGRESS NOTES
Video Visit - Abi Breath 41 y.o. male MRN: [id number]    REQUIRED DOCUMENTATION:         1. This service was provided via [employer]. 2. Provider located at [address] [facility]  [address] [facility] [address] [phone]. 3. [facility] provider: MERLYN Marks. 4. Identify all parties in room with patient during [facility] visit:  patient  5. After connecting through televideo, patient was identified by name and date of birth. Patient was then informed that this was a Telemedicine visit and that the exam was being conducted confidentially over secure lines. My office door was closed. No one else was in the room. Patient acknowledged consent and understanding of privacy and security of the Telemedicine visit. I informed the patient that I have reviewed their record in Epic and presented the opportunity for them to ask any questions regarding the visit today. The patient agreed to participate. This is a 41year old male here today for video visit. He states he has had some sob. He stats like it feels harder to take a deep breath in. He states symptoms started 2-3 days. He had this happening for about the last 4 years during this time of season. Symptoms resolve during winter time. No chest pain or discomfort. He has not noticed any wheezing. No cough or congestion. He states the inhaler and singulair had helped in the past. No cough or congestion. He states symptoms are the exact same symptoms he had in the past.  With the same time of year onset. Reviewed previous records and patient has had this in the past and treated with singulair and inhaler. Review of Systems   Constitutional: Negative for activity change, chills, fatigue and fever. Respiratory: Positive for shortness of breath. Negative for choking and wheezing. Cardiovascular: Negative. Neurological: Negative. Psychiatric/Behavioral: Negative.       Vitals:    07/08/23 1524   Resp: 16     Physical Exam  Constitutional:       General: He is not in acute distress. Appearance: Normal appearance. He is not ill-appearing or toxic-appearing. HENT:      Head: Normocephalic and atraumatic. Nose: No congestion. Eyes:      Conjunctiva/sclera: Conjunctivae normal.   Pulmonary:      Effort: Pulmonary effort is normal. No respiratory distress. Comments: No cough or audible wheezing   Skin:     Comments: No rash on head or neck. Neurological:      Mental Status: He is alert. Psychiatric:         Mood and Affect: Mood normal.         Behavior: Behavior normal.         Thought Content: Thought content normal.         Judgment: Judgment normal.       Diagnoses and all orders for this visit:    Allergic rhinitis due to pollen, unspecified seasonality  -     montelukast (SINGULAIR) 10 mg tablet; Take 1 tablet (10 mg total) by mouth daily at bedtime for 14 days  -     albuterol (ProAir HFA) 90 mcg/act inhaler; Inhale 2 puffs every 6 (six) hours as needed for wheezing      Patient Instructions   Will refill your medications. Follow up with PCP. Start daily allergy medication. As we discussed, go to ER with any worsening symptoms, chest pain, sob or difficulty breathing. Follow up with PCP if not improved, if symptoms are worse, go to the ER.

## 2023-07-08 NOTE — CARE ANYWHERE EVISITS
Visit Summary for Pura Fernandez - Gender: Male - Date of Birth: [id number]  Date: [id number] - Duration: 6 minutes  Patient: Pura Fernandez  Provider: Hector ZULETA    Patient Contact Information  Address  [address]; [address]  [phone]    Visit Topics    Triage Questions   What is your current physical address in the event of a medical emergency? Answer []  Are you allergic to any medications? Answer []  Are you now or could you be pregnant? Answer []  Do you have any immune system compromise or chronic lung   disease? Answer []  Do you have any vulnerable family members in the home (infant, pregnant, cancer, elderly)? Answer []     Conversation Transcripts  [0A][0A] [Notification] You are connected with Donta Harris, Urgent Care Specialist.[0A][Notification] Allen Sanchez is located in [place]. [0A][Notification] Allen Sanchez has shared health history. Theodor Points .[0A]    Diagnosis  Allergic rhinitis due to pollen    Procedures  Value: [id number] Code: CPT-4 UNLISTED E&M SERVICE    Medications Prescribed    No prescriptions ordered    Electronically signed by: Donta Weiner(NPI [id number])

## 2023-07-08 NOTE — PATIENT INSTRUCTIONS
Will refill your medications. Follow up with PCP. Start daily allergy medication. As we discussed, go to ER with any worsening symptoms, chest pain, sob or difficulty breathing.